# Patient Record
Sex: FEMALE | Race: WHITE | NOT HISPANIC OR LATINO | Employment: UNEMPLOYED | URBAN - NONMETROPOLITAN AREA
[De-identification: names, ages, dates, MRNs, and addresses within clinical notes are randomized per-mention and may not be internally consistent; named-entity substitution may affect disease eponyms.]

---

## 2018-06-07 PROCEDURE — 87086 URINE CULTURE/COLONY COUNT: CPT | Performed by: PHYSICIAN ASSISTANT

## 2021-04-15 ENCOUNTER — LAB (OUTPATIENT)
Dept: LAB | Facility: OTHER | Age: 26
End: 2021-04-15

## 2021-04-15 PROCEDURE — 87635 SARS-COV-2 COVID-19 AMP PRB: CPT | Performed by: PHYSICIAN ASSISTANT

## 2023-07-25 ENCOUNTER — OFFICE VISIT (OUTPATIENT)
Dept: SLEEP MEDICINE | Facility: HOSPITAL | Age: 28
End: 2023-07-25
Payer: COMMERCIAL

## 2023-07-25 VITALS
BODY MASS INDEX: 39.8 KG/M2 | HEIGHT: 63 IN | WEIGHT: 224.6 LBS | OXYGEN SATURATION: 96 % | HEART RATE: 108 BPM | SYSTOLIC BLOOD PRESSURE: 158 MMHG | DIASTOLIC BLOOD PRESSURE: 88 MMHG

## 2023-07-25 DIAGNOSIS — G47.10 HYPERSOMNIA: ICD-10-CM

## 2023-07-25 DIAGNOSIS — F51.04 PSYCHOPHYSIOLOGICAL INSOMNIA: ICD-10-CM

## 2023-07-25 DIAGNOSIS — R06.83 SNORING: Primary | ICD-10-CM

## 2023-07-25 PROCEDURE — 99213 OFFICE O/P EST LOW 20 MIN: CPT | Performed by: NURSE PRACTITIONER

## 2023-07-25 NOTE — PROGRESS NOTES
Sleep Clinic Follow-Up    CHIEF COMPLAINT: follow up sleep testing    LAST OV: 06/20/2023 (I reviewed this note)    HPI:  The patient is a 28 y.o. female.  I discussed the results of the recent home sleep study performed on 07/11/2023.  Total presumed sleep time was 5 hrs 56 minutes. The AHI/NICOLE was 2.2. Mean O2 96% with a hussein of 88%. Average pulse 83 BPM. Snoring present.       INTERVAL MEDICAL HISTORY: No change since last office visit in regard to the patient's bedtime routine, medications, or diagnosis.    PAP Data:  Currently not on therapy         MEDICATIONS:   Current Outpatient Medications:     Ajovy 225 MG/1.5ML solution prefilled syringe, , Disp: , Rfl:     desvenlafaxine (PRISTIQ) 100 MG 24 hr tablet, Take 1 tablet by mouth Daily., Disp: 30 tablet, Rfl: 11    eletriptan (RELPAX) 40 MG tablet, , Disp: , Rfl:     ergocalciferol (ERGOCALCIFEROL) 1.25 MG (63784 UT) capsule, Take 1 capsule by mouth., Disp: , Rfl:     famotidine (PEPCID) 40 MG tablet, Take 1 tablet by mouth., Disp: , Rfl:     Nortrel 1/35, 28, 1-35 MG-MCG per tablet, TAKE 1 TABLET BY MOUTH DAILY. BEGIN SUNDAY AFTER PERIOD STARTS, Disp: , Rfl:     PHYSICAL EXAM  Vitals:    07/25/23 1443   BP: 158/88   Pulse: 108   SpO2: 96%           07/25/23  1443   Weight: 102 kg (224 lb 9.6 oz)       Body mass index is 39.8 kg/m².  Class 2 Severe Obesity (BMI >=35 and <=39.9). Obesity-related health conditions include the following: obstructive sleep apnea. Obesity is unchanged. BMI is is above average; BMI management plan is completed. Recommend portion control and increasing exercise.        Gen:  No acute distress heard in voice, alert, oriented  Eyes:    Moist conjunctiva, EOMI   Lungs:  Normal effort, non-labored breathing  Heart:  No lower extremity edema   Psych:  Appropriate affect   Neuro:  Cn2-12 appear intact     Assessment and Plan:    Hypersomnia - stable, additional work-up planned   Check PSG   Discussed other etiologies of  fatigue/sleepiness  Drowsy driving tips- do not drive if feeling sleepy   Return to clinic in 2 weeks after study with results or sooner if needed   Snoring   Morbid obesity       The sleep results were discussed in detail with the patient.  The risks of untreated sleep apnea were reviewed. HST not diagnostic of sleep apnea. Recommend PSG due to patient symptoms and HST may under estimate severity of sleep apnea. I counseled patient on PSG, sleep hygiene, including regular sleep wake schedule and stimulus control therapy, the importance of weight reduction, safe driving and abstaining from smoking and alcohol consumption, as well as other sedatives.       All of the patient's questions were answered. She states understanding and agreement with my assessment and plan as above.     I obtained a brief history from the patient, reviewed the medical problems and current medications, and made medical decisions regarding treatment based on that information. Total visit time 21 min, with total of 11 minutes spent counseling patient regarding: Weight loss, Lifestyle modifications, Sleep hygiene, Study results, and Further testing.       RTC 2 weeks after testing   She agrees to return sooner on a prn basis if sx change.           This document has been electronically signed by HARSHA Figueredo on July 25, 2023 14:47 CDT            CC: Ita Davila DO          No ref. provider found